# Patient Record
Sex: FEMALE | Race: WHITE | NOT HISPANIC OR LATINO | Employment: FULL TIME | URBAN - METROPOLITAN AREA
[De-identification: names, ages, dates, MRNs, and addresses within clinical notes are randomized per-mention and may not be internally consistent; named-entity substitution may affect disease eponyms.]

---

## 2017-01-31 ENCOUNTER — GENERIC CONVERSION - ENCOUNTER (OUTPATIENT)
Dept: OTHER | Facility: OTHER | Age: 47
End: 2017-01-31

## 2017-06-19 ENCOUNTER — GENERIC CONVERSION - ENCOUNTER (OUTPATIENT)
Dept: OTHER | Facility: OTHER | Age: 47
End: 2017-06-19

## 2017-08-18 ENCOUNTER — ALLSCRIPTS OFFICE VISIT (OUTPATIENT)
Dept: OTHER | Facility: OTHER | Age: 47
End: 2017-08-18

## 2017-11-10 NOTE — CONSULTS
Assessment    1  Common migraine without aura (346 10) (G43 009)    Plan   Follow-up visit in 3 months Evaluation and Treatment  Follow-up 30 min  Status: Hold For - Scheduling  Requested for: [id number] Ordered; For: Common migraine without aura; Ordered By: Tory Pavon  Performed:   Due: [id number]   Discussion/Summary  Discussion Summary:   Emily Hurd is a 49 yo woman with migraine headaches who is presenting to Neurology clinic for evaluation of her headaches  Neurologic examination was normal   Migraine without aura, not intractable, without status migrainosus  Reason the description of her headaches, these are most consistent with migraine headaches  She does not have any aura symptoms  She is currently having headaches quite frequently, but they have only been occurring this frequently over the last few months  I discussed the nature of her headaches, the possibility of starting a daily preventative medication  Because her headaches are relatively new, she for further wait on any medications  discussed that she could try start taking magnesium oxide 400 milligrams daily and possibly add riboflavin 400 milligrams daily to reduce the frequency and severity of her headaches  she gets a headache, she can try taking naproxen 440 milligrams of every 12 hours as needed for headache  To avoid the potential for medication overuse headache, as that she avoid taking it more than 2 to 3 times a week    will return to the office in about 3-4 months  given to patient: For your headaches, you can start taking Magnesium Oxide 400 mg daily  If this does not work, you can try adding Riboflavin (Vitamin B2) 400 mg daily  When you get a headache, you can try taking Naproxen 440 mg (2 tabs) up to every 12 hours  Avoid taking as needed medicaitons for your headaches more than 2-3 times a week        Chief Complaint  Chief Complaint Free Text Note Form: Patient present for a consult regarding headaches      History of Present Illness  HPI: Jacek Garcia is a 51 yo woman with migraine headaches who is presenting to Neurology clinic for evaluation of her headaches  Briefly reviewing her history, she has a longstanding history of migraine headaches, which were in the past maybe about once per year  Over the course of the last 3 months, she has started have much more frequent headaches, which have occurred up to a whole week at a time  She describes the headache as starting in the top or back of her neck progressing to a throbbing pain with associated photophobia and occasional nausea  She also gets some associated lightheadedness and dizziness  They are currently occurring about 1-2 times per week  She denies any aura symptoms  The typically will last 4-5 hours  She will typically take Advil about 2 to 3 times a week, which helps a little bit for headache, but then it comes back  There is no change with headache with lifting, coughing, or Valsalva  It perhaps gets alittle better when she lays down  also complains of pain in her back and neck which is worse if she is running  This is present at all times  She denies any radicular pain or weakness or numbness  of old records: I reviewed prior notes including notes from her GYN, which are summarized incorporated above    Work-up:MRI brain: 10/14/2013: 3 x 13 millimeter in posterior right frontal subcortical perivascular space  No mass or abnormal enhancement  Medications: none      Review of Systems  Neurological ROS:  Gastrointestinal: nausea  Musculoskeletal: head/neck/back pain  Neurological General: headache-- and-- lightheadedness  Neurological Cranial Nerves: vertigo or dizziness  ROS Reviewed:   ROS reviewed  Active Problems    1  Breast self examination education, encounter for (V65 49) (Z71 89)   2  Cervical cancer screening (V76 2) (Z12 4)   3  Dyspareunia (625 0)   4  Encounter for routine gynecological examination (V72 31) (Z01 419)   5   Encounter for screening mammogram for malignant neoplasm of breast (V76 12) (Z12 31)   6  Female pelvic pain (625 9) (R10 2)   7  Leiomyoma of uterus (218 9) (D25 9)   8  Peripheral neuropathy (356 9) (G62 9)   9  Spondylosis of cervical region without myelopathy or radiculopathy (721 0) (W95 147)    Past Medical History  1  Breast self examination education, encounter for (V65 49) (Z71 89)   2  History of Endometrial polyp (621 0) (N84 0)   3  History of  3 (V22 2) (Z33 1)   4  History of menorrhagia (V13 29) (Z87 42)   5  History of Mild cervical dysplasia (622 11) (N87 0)   6  History of Postcoital bleeding (626 7) (N93 0)   7  History of Spontaneous pregnancy loss (634 90) (O03 9)   8  History of Vaginitis due to Candida albicans (112 1) (B37 3)  Active Problems And Past Medical History Reviewed: The active problems and past medical history were reviewed and updated today  Surgical History  1  History of Breast Surgery Enlargement Procedure   2  History of Cervical Loop Electrosurgical Excision (LEEP)   3  History of Colposcopy Cervix With Biopsy(S) With Endocervical Curettage   4  History of Dilation And Curettage   5  History of Hysteroscopy With Endometrial Ablation   6  History of Hysteroscopy With Resection For Intrauterine Polyp Removal   7  History of Oral Surgery Tooth Extraction   8  History of Tubal Ligation  Surgical History Reviewed: The surgical history was reviewed and updated today  Family History  Mother    1  Family history of Adopted  Father    2  Family history of Adopted  Family History Reviewed: The family history was reviewed and updated today         Social History     · Being A Social Drinker   · Caffeine Use   · Daily Coffee Consumption (2  Cups/Day)   ·    · Denied: History of Drug Use   · Educational Level - Completed Bachelors Degree   · Former smoker (V15 82) (V91 894)   · History of Former smoker (V15 82) (V28 532)   · Tubal ligations (V26 51) (Z98 51)   · Two children   · Uses Safety Equipment - Seatbelts  Social History Reviewed: The social history was reviewed and updated today  Lives in [facility] with her boyfriend and 2 children      Current Meds   1  Advil 200 MG Oral Tablet; TAKE 1 TABLET 3 TIMES DAILY AS NEEDED; Therapy: (Recorded:18Aug2017) to Recorded  Medication List Reviewed: The medication list was reviewed and updated today  Allergies    1  Bactrim TABS    Vitals  Signs   Recorded: 18Aug2017 04:02PM   Heart Rate: 58  Systolic: 671  Diastolic: 78  Height: 5 ft 9 25 in  Weight: 157 lb 7 oz  BMI Calculated: 23 08  BSA Calculated: 1 87    Physical Exam  GENERAL EXAMINATION:  In general patient is well appearing and in no distress  There is no peripheral edema  NEUROLOGIC EXAMINATION:  Alert and oriented to person, date, location  Fund of knowledge is full with good understanding of medical situation  Recent and remote memory were intact  Mood and affect are appropriate  Attention is intact  Language function including fluency, naming, and comprehension intact  Cranial nerves: Pupils are equal round reactive to light and accommodation  Visual Fields are full to confrontation bilaterally  Optic discs are sharp with no evidence of papilledema  Extraocular movements are intact without nystagmus  Facial sensation is intact to light touch  No facial droop, face activates symmetrically  There is no dysarthria  Hearing was intact to finger rub  Tongue and uvula are midline and palate elevates symmetrically  Shoulder shrug  5/5  Motor Exam: No pronator drift  Bulk and tone are normal  Strength is 5/5 throughout  Deep tendon reflexes: Biceps 2+, brachioradialis 2+, patellar 2+, Achilles 2+ bilaterally  Negative Hoffmanâs   Sensation: Intact light touch  Coordination: Finger nose finger and heel to shin testing are without dysmetria  Gait: Negative romberg  Normal casual gait        Future Appointments    Date/Time Provider Specialty Site   12/29/2017 09:00 AM BRAIN Rabago   [facility]       Signatures   Electronically signed by : BRAIN Hannon ; Nov 9 2017  3:31PM EST                       (Author)

## 2018-01-13 VITALS
BODY MASS INDEX: 23.32 KG/M2 | HEART RATE: 58 BPM | DIASTOLIC BLOOD PRESSURE: 78 MMHG | WEIGHT: 157.44 LBS | HEIGHT: 69 IN | SYSTOLIC BLOOD PRESSURE: 147 MMHG

## 2018-08-29 ENCOUNTER — ANNUAL EXAM (OUTPATIENT)
Dept: OBGYN CLINIC | Facility: CLINIC | Age: 48
End: 2018-08-29
Payer: COMMERCIAL

## 2018-08-29 VITALS
SYSTOLIC BLOOD PRESSURE: 116 MMHG | WEIGHT: 159.8 LBS | BODY MASS INDEX: 22.88 KG/M2 | HEIGHT: 70 IN | DIASTOLIC BLOOD PRESSURE: 82 MMHG

## 2018-08-29 DIAGNOSIS — Z01.419 ENCOUNTER FOR ANNUAL ROUTINE GYNECOLOGICAL EXAMINATION: Primary | ICD-10-CM

## 2018-08-29 DIAGNOSIS — Z12.39 BREAST CANCER SCREENING: ICD-10-CM

## 2018-08-29 PROCEDURE — S0610 ANNUAL GYNECOLOGICAL EXAMINA: HCPCS | Performed by: OBSTETRICS & GYNECOLOGY

## 2018-08-29 RX ORDER — MULTIVITAMIN
1 TABLET ORAL DAILY
COMMUNITY

## 2018-08-29 RX ORDER — LANOLIN ALCOHOL/MO/W.PET/CERES
CREAM (GRAM) TOPICAL
COMMUNITY

## 2018-08-29 RX ORDER — IBUPROFEN 200 MG
1 TABLET ORAL 3 TIMES DAILY PRN
COMMUNITY

## 2018-08-29 NOTE — PROGRESS NOTES
Assessment/Plan:    Pap smear done as well as annual   Encouraged self-breast examination as well as calcium supplementation  Continue annual 3D mammogram   She is due for baseline screening colonoscopy this year  Reviewed delisa menopausal symptoms  All questions answered  Return to office in 1 year  No problem-specific Assessment & Plan notes found for this encounter  Diagnoses and all orders for this visit:    Encounter for annual routine gynecological examination    Breast cancer screening  -     Mammo screening bilateral w 3d & cad; Future    Other orders  -     ibuprofen (ADVIL) 200 mg tablet; Take 1 tablet by mouth 3 (three) times a day as needed  -     calcium citrate-vitamin D (CITRACAL+D) 315-200 MG-UNIT per tablet; Take by mouth  -     Multiple Vitamin (MULTI-VITAMIN DAILY) TABS; Take 1 tablet by mouth daily          Subjective:      Patient ID: Heidi Oleary is a 46 y o  female  HPI     This is a 47-year-old female  ( x2, age 19, 23) presents for her annual gyn exam   Patient underwent an endometrial ablation in   Since then she has been amenorrheic  She does get cyclic breast discomfort  More recently she has been getting mild night sweats which are tolerable  Prior to the ablation her menstrual cycles are regular every 4 weeks described as very heavy  She is very happy with the ablation  She denies any changes in bowel or bladder function  More recently she has noticed a change in bladder function  Patient does enjoy a running long distances in may have some slight incontinence if she has not voided prior  Patient has also had a LEEP procedure done on 2 separate occasions, most recent  revealing dysplasia  She states her Pap smears since then have been normal   She is sexually active x 10 years, engaged to be   She is due for her mammogram next month      The following portions of the patient's history were reviewed and updated as appropriate: allergies, current medications, past family history, past medical history, past social history, past surgical history and problem list     Review of Systems   Constitutional: Negative for fatigue, fever and unexpected weight change  Respiratory: Negative for cough, chest tightness, shortness of breath and wheezing  Cardiovascular: Negative  Negative for chest pain and palpitations  Gastrointestinal: Negative  Negative for abdominal distention, abdominal pain, blood in stool, constipation, diarrhea, nausea and vomiting  Genitourinary: Negative  Negative for difficulty urinating, dyspareunia, dysuria, flank pain, frequency, genital sores, hematuria, pelvic pain, urgency, vaginal bleeding, vaginal discharge and vaginal pain  Skin: Negative for rash  Objective:      /82   Ht 5' 10" (1 778 m)   Wt 72 5 kg (159 lb 12 8 oz)   Breastfeeding? No   BMI 22 93 kg/m²          Physical Exam   Constitutional: She appears well-developed and well-nourished  Cardiovascular: Normal rate and regular rhythm  Pulmonary/Chest: Effort normal and breath sounds normal  Right breast exhibits no inverted nipple, no mass, no nipple discharge, no skin change and no tenderness  Left breast exhibits no inverted nipple, no mass, no nipple discharge, no skin change and no tenderness  Bilateral inframammary scars noted from surgery, bilateral silicone implants noted  Abdominal: Soft  Bowel sounds are normal  She exhibits no distension  There is no tenderness  There is no rebound and no guarding  Genitourinary: Rectum normal, vagina normal and uterus normal  There is no lesion on the right labia  There is no lesion on the left labia  Cervix exhibits no discharge and no friability  Right adnexum displays no mass, no tenderness and no fullness  Left adnexum displays no mass, no tenderness and no fullness  No vaginal discharge found     Genitourinary Comments: Cervix distorted due to prior surgery, cervical os is noted to be stenotic  She has a mild cystocele

## 2018-09-05 LAB
CLINICAL INFO: NORMAL
CYTO CVX: NORMAL
CYTOLOGY CMNT CVX/VAG CYTO-IMP: NORMAL
DATE PREVIOUS BX: NORMAL
HPV E6+E7 MRNA CVX QL NAA+PROBE: NOT DETECTED
LMP START DATE: NORMAL
SL AMB PREV. PAP:: NORMAL
SPECIMEN SOURCE CVX/VAG CYTO: NORMAL

## 2019-08-27 ENCOUNTER — ANNUAL EXAM (OUTPATIENT)
Dept: OBGYN CLINIC | Facility: CLINIC | Age: 49
End: 2019-08-27
Payer: COMMERCIAL

## 2019-08-27 VITALS
SYSTOLIC BLOOD PRESSURE: 122 MMHG | HEIGHT: 70 IN | DIASTOLIC BLOOD PRESSURE: 78 MMHG | BODY MASS INDEX: 21.05 KG/M2 | WEIGHT: 147 LBS

## 2019-08-27 DIAGNOSIS — Z12.39 BREAST CANCER SCREENING: ICD-10-CM

## 2019-08-27 DIAGNOSIS — Z01.419 ENCOUNTER FOR ANNUAL ROUTINE GYNECOLOGICAL EXAMINATION: Primary | ICD-10-CM

## 2019-08-27 PROCEDURE — S0612 ANNUAL GYNECOLOGICAL EXAMINA: HCPCS | Performed by: OBSTETRICS & GYNECOLOGY

## 2019-08-27 NOTE — PROGRESS NOTES
Assessment/Plan:    Pap smear done as well as annual   Encouraged self-breast examination as well as calcium supplementation  Continue annual mammogram   Reviewed menopausal symptoms  All questions answered  Return to office in 1 year or p r n  No problem-specific Assessment & Plan notes found for this encounter  Diagnoses and all orders for this visit:    Encounter for annual routine gynecological examination    Breast cancer screening  -     Mammo screening bilateral w 3d & cad; Future          Subjective:      Patient ID: Anay Martinez is a 44 y o  female  HPI    This is a 49-year-old female  ( x2, age 22, 21) presents for her annual gyn exam   Patient underwent endometrial ablation in 2014  Since then she has been amenorrheic  She has been getting hot flashes and night sweats which are tolerable and has significantly decreased over the last several months  Patient is sexually active and has been in a monogamous relationship with her  for over 11 years  She has a history of abnormal Pap smears resulting in LEEP procedure x 2, last LEEP   Her Pap smears since then have been normal  She denies any changes in bowel or bladder function  She underwent a screening colonoscopy 10/2018, within normal limits, follow-up in 10 years  The following portions of the patient's history were reviewed and updated as appropriate: allergies, current medications, past family history, past medical history, past social history, past surgical history and problem list     Review of Systems   Constitutional: Negative for fatigue, fever and unexpected weight change  Respiratory: Negative for cough, chest tightness, shortness of breath and wheezing  Cardiovascular: Negative  Negative for chest pain and palpitations  Gastrointestinal: Negative  Negative for abdominal distention, abdominal pain, blood in stool, constipation, diarrhea, nausea and vomiting  Genitourinary: Negative  Negative for difficulty urinating, dyspareunia, dysuria, flank pain, frequency, genital sores, hematuria, pelvic pain, urgency, vaginal bleeding, vaginal discharge and vaginal pain  Skin: Negative for rash  Objective:      /78   Ht 5' 10" (1 778 m)   Wt 66 7 kg (147 lb)   Breastfeeding? No   BMI 21 09 kg/m²          Physical Exam   Constitutional: She appears well-developed and well-nourished  Cardiovascular: Normal rate and regular rhythm  Pulmonary/Chest: Effort normal and breath sounds normal  Right breast exhibits no inverted nipple, no mass, no nipple discharge, no skin change and no tenderness  Left breast exhibits no inverted nipple, no mass, no nipple discharge, no skin change and no tenderness  Bilateral saline implants noted  Abdominal: Soft  Bowel sounds are normal  She exhibits no distension  There is no tenderness  There is no rebound and no guarding  Genitourinary: Vagina normal and uterus normal  There is no lesion on the right labia  There is no lesion on the left labia  Cervix exhibits no discharge and no friability  Right adnexum displays no mass, no tenderness and no fullness  Left adnexum displays no mass, no tenderness and no fullness  No vaginal discharge found  Genitourinary Comments: The vagina is evident of slight estrogen deficiency  The cervix is distorted due to prior surgery  Cervical os is noted to be stenotic  There is no evidence of pelvic floor prolapse

## 2020-09-16 ENCOUNTER — ANNUAL EXAM (OUTPATIENT)
Dept: OBGYN CLINIC | Facility: CLINIC | Age: 50
End: 2020-09-16
Payer: COMMERCIAL

## 2020-09-16 VITALS
SYSTOLIC BLOOD PRESSURE: 110 MMHG | TEMPERATURE: 98 F | DIASTOLIC BLOOD PRESSURE: 66 MMHG | HEIGHT: 70 IN | BODY MASS INDEX: 21.19 KG/M2 | WEIGHT: 148 LBS

## 2020-09-16 DIAGNOSIS — Z01.419 ENCOUNTER FOR ANNUAL ROUTINE GYNECOLOGICAL EXAMINATION: Primary | ICD-10-CM

## 2020-09-16 DIAGNOSIS — Z12.39 BREAST CANCER SCREENING: ICD-10-CM

## 2020-09-16 PROCEDURE — S0612 ANNUAL GYNECOLOGICAL EXAMINA: HCPCS | Performed by: OBSTETRICS & GYNECOLOGY

## 2020-09-16 NOTE — PROGRESS NOTES
Assessment/Plan:  Pap smear done for cytology, reflex HPV  Encouraged self-breast examination as well as calcium supplementation  Continue annual mammogram   Reviewed colon cancer screening, due for colonoscopy   She will continue to follow-up with her family physician as scheduled  Return to office in 1 year or p r n  No problem-specific Assessment & Plan notes found for this encounter  Diagnoses and all orders for this visit:    Encounter for annual routine gynecological examination  -     Thinprep Pap (Refl) HPV mRNA E6/E7    Breast cancer screening  -     Mammo screening bilateral w 3d & cad; Future          Subjective:      Patient ID: Shanelle Arias is a 44 y o  female  HPI     This is a very pleasant 64-year-old female  ( x2, age 23, 19) presents for annual gyn exam   Patient underwent endometrial ablation in   She has been amenorrheic since then  She gets occasional hot flashes however they have markedly improved in the last 1-2 years  She denies any changes in bowel or bladder function  She is sexually active and has been in a monogamous relationship with her  for over 12 years  She underwent colonoscopy in  revealing polyps with follow-up in 5 years  She does follow up with her family physician on a regular basis  The following portions of the patient's history were reviewed and updated as appropriate: allergies, current medications, past family history, past medical history, past social history, past surgical history and problem list     Review of Systems   Constitutional: Negative for fatigue, fever and unexpected weight change  Respiratory: Negative for cough, chest tightness, shortness of breath and wheezing  Cardiovascular: Negative  Negative for chest pain and palpitations  Gastrointestinal: Negative  Negative for abdominal distention, abdominal pain, blood in stool, constipation, diarrhea, nausea and vomiting  Genitourinary: Negative  Negative for difficulty urinating, dyspareunia, dysuria, flank pain, frequency, genital sores, hematuria, pelvic pain, urgency, vaginal bleeding, vaginal discharge and vaginal pain  Skin: Negative for rash  Objective:      /66   Temp 98 °F (36 7 °C)   Ht 5' 10" (1 778 m)   Wt 67 1 kg (148 lb)   BMI 21 24 kg/m²          Physical Exam  Constitutional:       Appearance: She is well-developed  Cardiovascular:      Rate and Rhythm: Normal rate and regular rhythm  Pulmonary:      Effort: Pulmonary effort is normal       Breath sounds: Normal breath sounds  Chest:      Breasts:         Right: No inverted nipple, mass, nipple discharge, skin change or tenderness  Left: No inverted nipple, mass, nipple discharge, skin change or tenderness  Abdominal:      General: Bowel sounds are normal  There is no distension  Palpations: Abdomen is soft  Tenderness: There is no abdominal tenderness  There is no guarding or rebound  Genitourinary:     Labia:         Right: No rash, tenderness or lesion  Left: No rash, tenderness or lesion  Vagina: Normal  No vaginal discharge  Cervix: No cervical motion tenderness, discharge, friability, lesion, erythema or cervical bleeding  Uterus: Normal        Adnexa:         Right: No mass, tenderness or fullness  Left: No mass, tenderness or fullness  Rectum: Normal       Comments: External genitalia is within normal limits  The vagina is evident of slight estrogen deficiency  There is no evidence of pelvic floor prolapse  Rectovaginal exam is confirmatory

## 2020-09-18 LAB
CLINICAL INFO: NORMAL
CYTO CVX: NORMAL
DATE PREVIOUS BX: NORMAL
LMP START DATE: NORMAL
SL AMB PREV. PAP:: NORMAL
SPECIMEN SOURCE CVX/VAG CYTO: NORMAL

## 2021-04-21 ENCOUNTER — OFFICE VISIT (OUTPATIENT)
Dept: OBGYN CLINIC | Facility: CLINIC | Age: 51
End: 2021-04-21
Payer: COMMERCIAL

## 2021-04-21 VITALS
HEIGHT: 70 IN | DIASTOLIC BLOOD PRESSURE: 76 MMHG | WEIGHT: 163 LBS | BODY MASS INDEX: 23.34 KG/M2 | SYSTOLIC BLOOD PRESSURE: 122 MMHG

## 2021-04-21 DIAGNOSIS — N94.10 DYSPAREUNIA IN FEMALE: ICD-10-CM

## 2021-04-21 DIAGNOSIS — N95.1 PERIMENOPAUSE: Primary | ICD-10-CM

## 2021-04-21 DIAGNOSIS — R23.2 HOT FLASHES: ICD-10-CM

## 2021-04-21 PROCEDURE — 99214 OFFICE O/P EST MOD 30 MIN: CPT | Performed by: OBSTETRICS & GYNECOLOGY

## 2021-04-21 NOTE — PROGRESS NOTES
Assessment/Plan:    Discussed perimenopause/menopausal symptoms  Reviewed over-the-counter, conservative supplements, vaginal lubricants, vaginal moisturizers  Discussed hormone replacement therapy  Risks and benefits reviewed  At this point we would remain conservative  She will start with estroven daily for vasomotor symptoms  And vaginal moisturizer 2 times per week  She will call with update in 2 months  Return to office for scheduled gyn annual 2021  No problem-specific Assessment & Plan notes found for this encounter  There are no diagnoses linked to this encounter  Subjective:      Patient ID: Frankey Purchase is a 44 y o  female  HPI      this is a 57-year-old female  ( x2, age 23, 19) presents complaining of increase in hot flashes and night sweats  She underwent an endometrial ablation in   She has been amenorrheic since then  She has noticed hot flashes starting 2020 progressively getting worse into night sweats awaken her in the middle of the night  She has been in a monogamous relationship with her  for over 13 years  She has noticed significant vaginal dryness which has resulted in decreased libido  She denies any vaginal bleeding or spotting  There has been no changes in bowel or bladder function  The following portions of the patient's history were reviewed and updated as appropriate: allergies, current medications, past family history, past medical history, past social history, past surgical history and problem list     Review of Systems   Constitutional: Negative for fatigue, fever and unexpected weight change  Respiratory: Negative for cough, chest tightness, shortness of breath and wheezing  Cardiovascular: Negative  Negative for chest pain and palpitations  Gastrointestinal: Negative  Negative for abdominal distention, abdominal pain, blood in stool, constipation, diarrhea, nausea and vomiting     Genitourinary: Positive for dyspareunia  Negative for difficulty urinating, dysuria, flank pain, frequency, genital sores, hematuria, pelvic pain, urgency, vaginal bleeding, vaginal discharge and vaginal pain  Skin: Negative for rash  Objective:      /76   Ht 5' 10" (1 778 m)   Wt 73 9 kg (163 lb)   BMI 23 39 kg/m²          Physical Exam  Constitutional:       Appearance: Normal appearance  Cardiovascular:      Rate and Rhythm: Normal rate  Pulmonary:      Effort: Pulmonary effort is normal    Skin:     General: Skin is warm and dry  Neurological:      Mental Status: She is alert and oriented to person, place, and time     Psychiatric:         Behavior: Behavior normal

## 2021-09-14 ENCOUNTER — ANNUAL EXAM (OUTPATIENT)
Dept: OBGYN CLINIC | Facility: CLINIC | Age: 51
End: 2021-09-14
Payer: COMMERCIAL

## 2021-09-14 VITALS
DIASTOLIC BLOOD PRESSURE: 80 MMHG | SYSTOLIC BLOOD PRESSURE: 118 MMHG | BODY MASS INDEX: 22.19 KG/M2 | HEIGHT: 70 IN | WEIGHT: 155 LBS

## 2021-09-14 DIAGNOSIS — Z12.31 ENCOUNTER FOR SCREENING MAMMOGRAM FOR BREAST CANCER: ICD-10-CM

## 2021-09-14 DIAGNOSIS — N95.1 MENOPAUSAL VASOMOTOR SYNDROME: ICD-10-CM

## 2021-09-14 DIAGNOSIS — Z01.419 ENCOUNTER FOR ANNUAL ROUTINE GYNECOLOGICAL EXAMINATION: Primary | ICD-10-CM

## 2021-09-14 PROCEDURE — S0612 ANNUAL GYNECOLOGICAL EXAMINA: HCPCS | Performed by: OBSTETRICS & GYNECOLOGY

## 2021-09-14 RX ORDER — ESTRADIOL 0.5 MG/1
0.5 TABLET ORAL DAILY
Qty: 30 TABLET | Refills: 2 | Status: SHIPPED | OUTPATIENT
Start: 2021-09-14 | End: 2021-11-14

## 2021-09-14 RX ORDER — MEDROXYPROGESTERONE ACETATE 2.5 MG/1
2.5 TABLET ORAL DAILY
Qty: 30 TABLET | Refills: 1 | Status: SHIPPED | OUTPATIENT
Start: 2021-09-14 | End: 2021-11-14

## 2021-09-14 NOTE — PROGRESS NOTES
Assessment/Plan:    Pap smear done for cytology, reflex HPV  Encouraged self-breast examination as well as calcium supplementation  Continue annual mammogram   Reviewed colon cancer screening, up-to-date with colonoscopy /  with follow-up in 5 years  Discussed treatment options for symptomatic vasomotor symptoms/menopause including conservative over-the-counter agents versus hormone replacement therapy  Risks and benefits reviewed  She has failed several over-the-counter agents and desires to start HRT  We will start low-dose Estrace 0 5 mg/ Provera 2 5 mg daily, reviewed side effects including possible breakthrough bleeding/ postmenopausal bleeding  She will call with update in 4 weeks  Then resume annual gyn exam   All questions answered  No problem-specific Assessment & Plan notes found for this encounter  Diagnoses and all orders for this visit:    Encounter for annual routine gynecological examination  -     Thinprep Tis Pap Reflex HPV mRNA E6/E7    Encounter for screening mammogram for breast cancer  -     Mammo screening bilateral w 3d & cad; Future    Menopausal vasomotor syndrome  -     estradiol (ESTRACE) 0 5 MG tablet; Take 1 tablet (0 5 mg total) by mouth daily  -     medroxyPROGESTERone (PROVERA) 2 5 mg tablet; Take 1 tablet (2 5 mg total) by mouth daily          Subjective:      Patient ID: Louretta Osgood is a 46 y o  female  HPI       This is a very pleasant 57-year-old female  ( x2, age 19, 22) presents for annual gyn exam   Patient underwent endometrial ablation in   She has been amenorrheic since then  She gets significant hot flashes and night sweats refractory to over-the-counter agents  We have discussed treatment options for vasomotor symptoms including supplements verses hormone replacement therapy  Risks and benefits reviewed  She has also used vaginal moisturizer which has been unsuccessful        Gyn history is significant for LEEP procedure x2, last LEEP 2013 revealing dysplasia  Patient has been in a monogamous relationship with her  for over 13 years  Pap smears have been normal     Last colonoscopy 2018 revealing polyps with follow-up in 5 years  The following portions of the patient's history were reviewed and updated as appropriate: allergies, current medications, past family history, past medical history, past social history, past surgical history and problem list     Review of Systems   Constitutional: Negative for fatigue, fever and unexpected weight change  Respiratory: Negative for cough, chest tightness, shortness of breath and wheezing  Cardiovascular: Negative  Negative for chest pain and palpitations  Gastrointestinal: Negative  Negative for abdominal distention, abdominal pain, blood in stool, constipation, diarrhea, nausea and vomiting  Genitourinary: Negative  Negative for difficulty urinating, dyspareunia, dysuria, flank pain, frequency, genital sores, hematuria, pelvic pain, urgency, vaginal bleeding, vaginal discharge and vaginal pain  Skin: Negative for rash  Objective:      /80   Ht 5' 10" (1 778 m)   Wt 70 3 kg (155 lb)   BMI 22 24 kg/m²          Physical Exam  Constitutional:       Appearance: Normal appearance  She is well-developed  Cardiovascular:      Rate and Rhythm: Normal rate and regular rhythm  Pulmonary:      Effort: Pulmonary effort is normal       Breath sounds: Normal breath sounds  Chest:      Breasts:         Right: No inverted nipple, mass, nipple discharge, skin change or tenderness  Left: No inverted nipple, mass, nipple discharge, skin change or tenderness  Abdominal:      General: Bowel sounds are normal  There is no distension  Palpations: Abdomen is soft  Tenderness: There is no abdominal tenderness  There is no guarding or rebound  Genitourinary:     Labia:         Right: No rash, tenderness or lesion           Left: No rash, tenderness or lesion  Vagina: Normal  No signs of injury  No vaginal discharge or tenderness  Cervix: No cervical motion tenderness, discharge, friability, lesion, erythema or cervical bleeding  Uterus: Not enlarged and not tender  Adnexa:         Right: No mass, tenderness or fullness  Left: No mass, tenderness or fullness  Comments: External genitalia is within normal limits  The vagina is evident of slight estrogen deficiency  Cervix is distorted due to prior surgery  Cervical os is noted to be stenotic  Neurological:      Mental Status: She is alert and oriented to person, place, and time     Psychiatric:         Behavior: Behavior normal

## 2021-10-08 DIAGNOSIS — N95.1 MENOPAUSAL VASOMOTOR SYNDROME: ICD-10-CM

## 2021-10-10 RX ORDER — MEDROXYPROGESTERONE ACETATE 2.5 MG/1
TABLET ORAL
Qty: 30 TABLET | Refills: 1 | OUTPATIENT
Start: 2021-10-10

## 2021-10-10 RX ORDER — ESTRADIOL 0.5 MG/1
0.5 TABLET ORAL DAILY
Qty: 90 TABLET | Refills: 1 | OUTPATIENT
Start: 2021-10-10

## 2021-11-14 DIAGNOSIS — N95.1 MENOPAUSAL VASOMOTOR SYNDROME: ICD-10-CM

## 2021-11-14 RX ORDER — MEDROXYPROGESTERONE ACETATE 2.5 MG/1
TABLET ORAL
Qty: 30 TABLET | Refills: 1 | Status: SHIPPED | OUTPATIENT
Start: 2021-11-14 | End: 2021-12-16

## 2021-11-14 RX ORDER — ESTRADIOL 0.5 MG/1
0.5 TABLET ORAL DAILY
Qty: 90 TABLET | Refills: 1 | Status: SHIPPED | OUTPATIENT
Start: 2021-11-14 | End: 2022-05-09

## 2021-12-11 DIAGNOSIS — N95.1 MENOPAUSAL VASOMOTOR SYNDROME: ICD-10-CM

## 2021-12-16 RX ORDER — MEDROXYPROGESTERONE ACETATE 2.5 MG/1
TABLET ORAL
Qty: 30 TABLET | Refills: 1 | Status: SHIPPED | OUTPATIENT
Start: 2021-12-16 | End: 2022-02-16

## 2022-01-09 DIAGNOSIS — N95.1 MENOPAUSAL VASOMOTOR SYNDROME: ICD-10-CM

## 2022-01-09 RX ORDER — MEDROXYPROGESTERONE ACETATE 2.5 MG/1
TABLET ORAL
Qty: 30 TABLET | Refills: 1 | OUTPATIENT
Start: 2022-01-09

## 2022-01-10 NOTE — TELEPHONE ENCOUNTER
Lm pt's as re: rfs Estrace & Provera - had escribed presc for Estrace 0 5 MG  #90 rf x 1 on 11/14/2021 & Provera 2 5 MG #30 rf x 1 on 12/11/2021  Can recall next month for Provera rf

## 2022-02-16 ENCOUNTER — TELEPHONE (OUTPATIENT)
Dept: OBGYN CLINIC | Facility: CLINIC | Age: 52
End: 2022-02-16

## 2022-02-16 DIAGNOSIS — N95.1 MENOPAUSAL VASOMOTOR SYNDROME: ICD-10-CM

## 2022-02-16 RX ORDER — MEDROXYPROGESTERONE ACETATE 2.5 MG/1
TABLET ORAL
Qty: 30 TABLET | Refills: 1 | Status: SHIPPED | OUTPATIENT
Start: 2022-02-16 | End: 2022-02-16 | Stop reason: SDUPTHER

## 2022-02-16 NOTE — TELEPHONE ENCOUNTER
Return call re: presc for Provera - pt had left ms re: 90 day supply  presc was escribed today for 30 day supply with rf x 1

## 2022-02-16 NOTE — TELEPHONE ENCOUNTER
Pt would like Provera presc escribed as 90 day supply as her Estrace presc is 90 day  Please sign off on presc to [facility])  cancelled Provera presc for 30 day supply which was escribed earlier today

## 2022-02-17 RX ORDER — MEDROXYPROGESTERONE ACETATE 2.5 MG/1
2.5 TABLET ORAL DAILY
Qty: 90 TABLET | Refills: 1 | Status: SHIPPED | OUTPATIENT
Start: 2022-02-17

## 2022-02-17 RX ORDER — MEDROXYPROGESTERONE ACETATE 2.5 MG/1
TABLET ORAL
Qty: 90 TABLET | Refills: 1 | Status: SHIPPED | OUTPATIENT
Start: 2022-02-17

## 2022-05-09 DIAGNOSIS — N95.1 MENOPAUSAL VASOMOTOR SYNDROME: ICD-10-CM

## 2022-05-09 RX ORDER — ESTRADIOL 0.5 MG/1
TABLET ORAL
Qty: 90 TABLET | Refills: 1 | Status: SHIPPED | OUTPATIENT
Start: 2022-05-09

## 2022-11-01 ENCOUNTER — ANNUAL EXAM (OUTPATIENT)
Dept: OBGYN CLINIC | Facility: CLINIC | Age: 52
End: 2022-11-01

## 2022-11-01 VITALS — DIASTOLIC BLOOD PRESSURE: 68 MMHG | SYSTOLIC BLOOD PRESSURE: 110 MMHG | BODY MASS INDEX: 21.95 KG/M2 | WEIGHT: 153 LBS

## 2022-11-01 DIAGNOSIS — N95.1 MENOPAUSAL VASOMOTOR SYNDROME: ICD-10-CM

## 2022-11-01 DIAGNOSIS — Z01.419 ENCOUNTER FOR ANNUAL ROUTINE GYNECOLOGICAL EXAMINATION: Primary | ICD-10-CM

## 2022-11-01 DIAGNOSIS — Z12.31 ENCOUNTER FOR SCREENING MAMMOGRAM FOR MALIGNANT NEOPLASM OF BREAST: ICD-10-CM

## 2022-11-01 RX ORDER — MEDROXYPROGESTERONE ACETATE 2.5 MG/1
2.5 TABLET ORAL DAILY
Qty: 90 TABLET | Refills: 4 | Status: SHIPPED | OUTPATIENT
Start: 2022-11-01

## 2022-11-01 RX ORDER — ESTRADIOL 0.5 MG/1
0.5 TABLET ORAL DAILY
Qty: 90 TABLET | Refills: 4 | Status: SHIPPED | OUTPATIENT
Start: 2022-11-01

## 2022-11-01 NOTE — PROGRESS NOTES
Assessment/Plan:  Pap smear done as well as annual   Encouraged self-breast examination as well as calcium supplementation  Continue annual mammogram   Reviewed colon cancer screening, up-to-date  Continue Estrace 0 5 mg, Provera 2 5 mg daily  Discussed risks and benefits of HRT  All questions answered  She will continue to follow-up with primary care as scheduled  Return to office in 1 year or p r n  No problem-specific Assessment & Plan notes found for this encounter  Diagnoses and all orders for this visit:    Encounter for annual routine gynecological examination    Encounter for screening mammogram for malignant neoplasm of breast  -     Mammo screening bilateral w 3d & cad; Future    Menopausal vasomotor syndrome  -     medroxyPROGESTERone (PROVERA) 2 5 mg tablet; Take 1 tablet (2 5 mg total) by mouth daily  -     estradiol (ESTRACE) 0 5 MG tablet; Take 1 tablet (0 5 mg total) by mouth daily          Subjective:      Patient ID: Omid Vitale is a 45 y o  female  HPI     This is a very pleasant 49-year-old female P2 ( x2, age 23, 23) presents for her gyn exam   She had an endometrial ablation in   She has been amenorrheic since then  She started HRT approximately 1 year ago with significant improvement in hot flashes and night sweats  She denies any bleeding or spotting  No changes in bowel or bladder function  She has been in a monogamous relationship with her  for over 14 years  Pap smears have been normal     Patient gyn history significant for LEEP procedure x 2, last LEEP 2013 revealing dysplasia  Pap smears have been normal since then  Last colonoscopy 2018 with follow-up in 5 years        The following portions of the patient's history were reviewed and updated as appropriate: allergies, current medications, past family history, past medical history, past social history, past surgical history and problem list     Review of Systems   Constitutional: Negative for fatigue, fever and unexpected weight change  Respiratory: Negative for cough, chest tightness, shortness of breath and wheezing  Cardiovascular: Negative  Negative for chest pain and palpitations  Gastrointestinal: Negative  Negative for abdominal distention, abdominal pain, blood in stool, constipation, diarrhea, nausea and vomiting  Genitourinary: Negative  Negative for difficulty urinating, dyspareunia, dysuria, flank pain, frequency, genital sores, hematuria, pelvic pain, urgency, vaginal bleeding, vaginal discharge and vaginal pain  Skin: Negative for rash  Objective:      /68   Wt 69 4 kg (153 lb)   BMI 21 95 kg/m²          Physical Exam  Constitutional:       Appearance: Normal appearance  She is well-developed  Cardiovascular:      Rate and Rhythm: Normal rate and regular rhythm  Pulmonary:      Effort: Pulmonary effort is normal       Breath sounds: Normal breath sounds  Chest:   Breasts:      Right: No inverted nipple, mass, nipple discharge, skin change or tenderness  Left: No inverted nipple, mass, nipple discharge, skin change or tenderness  Abdominal:      General: Bowel sounds are normal  There is no distension  Palpations: Abdomen is soft  Tenderness: There is no abdominal tenderness  There is no guarding or rebound  Genitourinary:     Labia:         Right: No rash, tenderness or lesion  Left: No rash, tenderness or lesion  Vagina: Normal  No signs of injury  No vaginal discharge or tenderness  Cervix: No cervical motion tenderness, discharge, friability, lesion, erythema or cervical bleeding  Uterus: Not enlarged and not tender  Adnexa:         Right: No mass, tenderness or fullness  Left: No mass, tenderness or fullness  Neurological:      Mental Status: She is alert and oriented to person, place, and time     Psychiatric:         Behavior: Behavior normal        external genitalia is within normal limits  The vagina is evident of slight estrogen deficiency  The cervix is distorted due to prior surgery  The os is stenotic  She has mild pelvic floor prolapse

## 2023-10-30 DIAGNOSIS — Z12.31 ENCOUNTER FOR SCREENING MAMMOGRAM FOR MALIGNANT NEOPLASM OF BREAST: Primary | ICD-10-CM

## 2024-01-08 DIAGNOSIS — Z12.31 ENCOUNTER FOR SCREENING MAMMOGRAM FOR MALIGNANT NEOPLASM OF BREAST: Primary | ICD-10-CM

## 2024-01-11 ENCOUNTER — ANNUAL EXAM (OUTPATIENT)
Dept: OBGYN CLINIC | Facility: CLINIC | Age: 54
End: 2024-01-11
Payer: COMMERCIAL

## 2024-01-11 VITALS
SYSTOLIC BLOOD PRESSURE: 122 MMHG | BODY MASS INDEX: 24.34 KG/M2 | HEIGHT: 70 IN | DIASTOLIC BLOOD PRESSURE: 70 MMHG | WEIGHT: 170 LBS

## 2024-01-11 DIAGNOSIS — Z01.419 ENCOUNTER FOR ANNUAL ROUTINE GYNECOLOGICAL EXAMINATION: Primary | ICD-10-CM

## 2024-01-11 DIAGNOSIS — Z12.31 ENCOUNTER FOR SCREENING MAMMOGRAM FOR MALIGNANT NEOPLASM OF BREAST: ICD-10-CM

## 2024-01-11 PROCEDURE — G0145 SCR C/V CYTO,THINLAYER,RESCR: HCPCS | Performed by: OBSTETRICS & GYNECOLOGY

## 2024-01-11 PROCEDURE — S0612 ANNUAL GYNECOLOGICAL EXAMINA: HCPCS | Performed by: OBSTETRICS & GYNECOLOGY

## 2024-01-11 NOTE — PROGRESS NOTES
Assessment/Plan:  Pap smear done for cytology, reflex HPV.  Encouraged self breast examination as well as calcium supplementation.  Continue annual mammogram.  Reviewed colon cancer screening, up-to-date.  Reviewed hormone replacement therapy.  She will finish out her current prescription and then monitor her symptoms over the next several weeks thereafter.  She will call with update.  Continue follow-up with her care and dermatology as scheduled.  Return to office in 1 year or as needed  No problem-specific Assessment & Plan notes found for this encounter.       Diagnoses and all orders for this visit:    Encounter for annual routine gynecological examination    Encounter for screening mammogram for malignant neoplasm of breast  -     Mammo screening bilateral w 3d & cad; Future          Subjective:      Patient ID: Guerda Austin is a 53 y.o. female.    HPI    This is a very pleasant 53-year-old female P2 ( x 2, age 23, 21) presents for her GYN exam.  She underwent endometrial ablation in .  She has been amenorrheic since then.  She started hormone replacement therapy approximately 2 years ago with significant improvement in hot flashes and night sweats.  She denies any temperature changes and is likely interested in coming off her hormones.  There is been no changes in bowel or bladder function.  She has been in a monogamous relationship for over 16 years.  Pap smears have been normal.    Her GYN history significant for LEEP procedure x 2, last leep  revealing dysplasia.  Pap smears have been normal since then.    Colonoscopy 2023 revealing polyps with follow-up in 5 years.    She does follow-up with a dermatologist on an annual basis.  I did mention small mole hyperpigmented medial right knee.  She is scheduled for dermatology checkup next month.    The following portions of the patient's history were reviewed and updated as appropriate: allergies, current medications, past family history, past  "medical history, past social history, past surgical history, and problem list.    Review of Systems   Constitutional:  Negative for fatigue, fever and unexpected weight change.   Respiratory:  Negative for cough, chest tightness, shortness of breath and wheezing.    Cardiovascular: Negative.  Negative for chest pain and palpitations.   Gastrointestinal: Negative.  Negative for abdominal distention, abdominal pain, blood in stool, constipation, diarrhea, nausea and vomiting.   Genitourinary: Negative.  Negative for difficulty urinating, dyspareunia, dysuria, flank pain, frequency, genital sores, hematuria, pelvic pain, urgency, vaginal bleeding, vaginal discharge and vaginal pain.   Skin:  Negative for rash.         Objective:      /70   Ht 5' 10\" (1.778 m)   Wt 77.1 kg (170 lb)   BMI 24.39 kg/m²          Physical Exam  Constitutional:       Appearance: Normal appearance. She is well-developed.   HENT:      Head: Normocephalic and atraumatic.   Cardiovascular:      Rate and Rhythm: Normal rate and regular rhythm.   Pulmonary:      Effort: Pulmonary effort is normal.      Breath sounds: Normal breath sounds.   Chest:   Breasts:     Right: No inverted nipple, mass, nipple discharge, skin change or tenderness.      Left: No inverted nipple, mass, nipple discharge, skin change or tenderness.   Abdominal:      General: Bowel sounds are normal. There is no distension.      Palpations: Abdomen is soft.      Tenderness: There is no abdominal tenderness. There is no guarding or rebound.   Genitourinary:     Labia:         Right: No rash, tenderness or lesion.         Left: No rash, tenderness or lesion.       Vagina: Normal. No signs of injury. No vaginal discharge or tenderness.      Cervix: No cervical motion tenderness, discharge, friability, lesion or cervical bleeding.      Uterus: Not enlarged and not tender.       Adnexa:         Right: No mass, tenderness or fullness.          Left: No mass, tenderness or " fullness.     Neurological:      Mental Status: She is alert.   Psychiatric:         Behavior: Behavior normal.

## 2024-01-18 LAB
LAB AP GYN PRIMARY INTERPRETATION: NORMAL
Lab: NORMAL

## 2024-03-15 DIAGNOSIS — Z00.6 ENCOUNTER FOR EXAMINATION FOR NORMAL COMPARISON OR CONTROL IN CLINICAL RESEARCH PROGRAM: ICD-10-CM

## 2024-03-23 ENCOUNTER — TRANSCRIBE ORDERS (OUTPATIENT)
Dept: LAB | Facility: CLINIC | Age: 54
End: 2024-03-23

## 2024-03-23 DIAGNOSIS — Z13.9 SCREENING FOR UNSPECIFIED CONDITION: Primary | ICD-10-CM

## 2024-03-28 ENCOUNTER — APPOINTMENT (OUTPATIENT)
Dept: LAB | Facility: CLINIC | Age: 54
End: 2024-03-28

## 2025-01-03 ENCOUNTER — TELEPHONE (OUTPATIENT)
Age: 55
End: 2025-01-03

## 2025-01-03 NOTE — TELEPHONE ENCOUNTER
Patient has been added to the Medication Management wait list without a referral.    Insurance: [employer]  Insurance Type:    Commercial [x]   Medicaid []   [facility] (if applicable)   Medicare []  Location Preference: any  Provider Preference: any  Virtual: Yes [x] No []  Were outside resources sent: Yes [] No [x]

## 2025-01-15 ENCOUNTER — ANNUAL EXAM (OUTPATIENT)
Dept: OBGYN CLINIC | Facility: CLINIC | Age: 55
End: 2025-01-15
Payer: COMMERCIAL

## 2025-01-15 VITALS
HEIGHT: 70 IN | SYSTOLIC BLOOD PRESSURE: 138 MMHG | BODY MASS INDEX: 22.28 KG/M2 | WEIGHT: 155.6 LBS | DIASTOLIC BLOOD PRESSURE: 88 MMHG

## 2025-01-15 DIAGNOSIS — Z11.3 SCREENING EXAMINATION FOR STD (SEXUALLY TRANSMITTED DISEASE): ICD-10-CM

## 2025-01-15 DIAGNOSIS — N95.2 VAGINAL ATROPHY: ICD-10-CM

## 2025-01-15 DIAGNOSIS — Z01.419 ENCOUNTER FOR ANNUAL ROUTINE GYNECOLOGICAL EXAMINATION: Primary | ICD-10-CM

## 2025-01-15 DIAGNOSIS — Z12.31 ENCOUNTER FOR SCREENING MAMMOGRAM FOR BREAST CANCER: ICD-10-CM

## 2025-01-15 PROCEDURE — 87491 CHLMYD TRACH DNA AMP PROBE: CPT | Performed by: OBSTETRICS & GYNECOLOGY

## 2025-01-15 PROCEDURE — S0612 ANNUAL GYNECOLOGICAL EXAMINA: HCPCS | Performed by: OBSTETRICS & GYNECOLOGY

## 2025-01-15 PROCEDURE — 87591 N.GONORRHOEAE DNA AMP PROB: CPT | Performed by: OBSTETRICS & GYNECOLOGY

## 2025-01-15 PROCEDURE — G0145 SCR C/V CYTO,THINLAYER,RESCR: HCPCS | Performed by: OBSTETRICS & GYNECOLOGY

## 2025-01-15 NOTE — PROGRESS NOTES
Name: Guerda Austin      : 1970      MRN: [id number]  Encounter Provider: Karen Kan DO  Encounter Date: 1/15/2025   Encounter department: [facility]  :  Assessment & Plan  Encounter for annual routine gynecological examination         Encounter for screening mammogram for breast cancer    Orders:    Mammo screening bilateral w 3d and cad; Future    Screening examination for STD (sexually transmitted disease)         Vaginal atrophy         Pap smear done for cytology, reflex HPV.  Encouraged self breast examination as well as calcium supplementation.  Continue annual mammogram.  Reviewed colon cancer screening, up to date.  She will keep me updated regarding her further workup, dry eyes/dry mouth.  Reviewed/precautions regarding vaginal atrophy/dryness.  Discussed over-the-counter agents.  All questions answered.  Return to office in 1 year or as needed    History of Present Illness   HPI  Guerda Austin is a 54 y.o. female who presents     This is a pleasant 54-year-old female P2 ( x 2, age 24, 22) presents for GYN exam.  her GYN history significant for endometrial ablation in , amenorrheic since then.  She was on hormone replacement therapy for approximately 3 years, discontinued last year.  She denies any hot flashes or night sweats.  No changes in bowel or bladder function.  She is sexually active, new relationship over the last couple of months.    She has been complaining of dry eyes, dry mouth over the last 4 months.  She is scheduled to see her family physician tomorrow for further evaluation.    GYN history significant for LEEP procedure x 2, Pap smears have been normal since then    Colonoscopy 2023 follow-up 5 years    Mammogram    History of LEEP procedure x 2 normal Paps since    On HRT          Review of Systems   Constitutional:  Negative for fatigue, fever and unexpected weight change.   Respiratory:  Negative for cough, chest tightness, shortness of  "breath and wheezing.    Cardiovascular: Negative.  Negative for chest pain and palpitations.   Gastrointestinal: Negative.  Negative for abdominal distention, abdominal pain, blood in stool, constipation, diarrhea, nausea and vomiting.   Genitourinary: Negative.  Negative for difficulty urinating, dyspareunia, dysuria, flank pain, frequency, genital sores, hematuria, pelvic pain, urgency, vaginal bleeding, vaginal discharge and vaginal pain.   Skin:  Negative for rash.          Objective   /88   Ht 5' 10\" (1.778 m)   Wt 70.6 kg (155 lb 9.6 oz)   BMI 22.33 kg/m²      Physical Exam  Constitutional:       Appearance: Normal appearance. She is well-developed.   HENT:      Head: Normocephalic and atraumatic.   Cardiovascular:      Rate and Rhythm: Normal rate and regular rhythm.   Pulmonary:      Effort: Pulmonary effort is normal.      Breath sounds: Normal breath sounds.   Chest:   Breasts:     Right: No inverted nipple, mass, nipple discharge, skin change or tenderness.      Left: No inverted nipple, mass, nipple discharge, skin change or tenderness.   Abdominal:      General: Bowel sounds are normal. There is no distension.      Palpations: Abdomen is soft.      Tenderness: There is no abdominal tenderness. There is no guarding or rebound.   Genitourinary:     Labia:         Right: No rash, tenderness or lesion.         Left: No rash, tenderness or lesion.       Vagina: Normal. No signs of injury. No vaginal discharge or tenderness.      Cervix: No cervical motion tenderness, discharge, friability, lesion or cervical bleeding.      Uterus: Not enlarged and not tender.       Adnexa:         Right: No mass, tenderness or fullness.          Left: No mass, tenderness or fullness.     Neurological:      Mental Status: She is alert.   Psychiatric:         Behavior: Behavior normal.     The vagina is evident of slight estrogen deficiency.  The cervix is slightly distorted due to prior surgery.  Cervical os is noted " to be stenotic.  No pelvic floor prolapse.    Administrative Statements   I have spent a total time of 30 minutes in caring for this patient on the day of the visit/encounter including Risks and benefits of tx options, Instructions for management, Impressions, Counseling / Coordination of care, Documenting in the medical record, Reviewing / ordering tests, medicine, procedures  , and Obtaining or reviewing history  .

## 2025-01-17 LAB
C TRACH DNA SPEC QL NAA+PROBE: NEGATIVE
N GONORRHOEA DNA SPEC QL NAA+PROBE: NEGATIVE

## 2025-01-21 LAB
LAB AP GYN PRIMARY INTERPRETATION: NORMAL
Lab: NORMAL
PATH INTERP SPEC-IMP: NORMAL

## 2025-01-22 ENCOUNTER — RESULTS FOLLOW-UP (OUTPATIENT)
Dept: OTHER | Facility: HOSPITAL | Age: 55
End: 2025-01-22

## 2025-01-22 DIAGNOSIS — N76.0 ACUTE VAGINITIS: Primary | ICD-10-CM

## 2025-01-22 RX ORDER — METRONIDAZOLE 500 MG/1
500 TABLET ORAL EVERY 12 HOURS SCHEDULED
Qty: 14 TABLET | Refills: 0 | Status: SHIPPED | OUTPATIENT
Start: 2025-01-22 | End: 2025-01-29

## 2025-01-22 NOTE — TELEPHONE ENCOUNTER
Patient called back and we discussed pap results and follow for BV. Reviewed med ordered, dosage, duration of therapy and how to take meds. Discussed recommendations to refrain from alcohol and intercourse during treatment. Patient verbalzied understanding of all information, no questions.

## 2025-04-17 ENCOUNTER — TRANSCRIBE ORDERS (OUTPATIENT)
Facility: HOSPITAL | Age: 55
End: 2025-04-17

## 2025-04-17 DIAGNOSIS — Z13.9 SCREENING FOR UNSPECIFIED CONDITION: Primary | ICD-10-CM

## 2025-07-22 ENCOUNTER — TELEPHONE (OUTPATIENT)
Age: 55
End: 2025-07-22